# Patient Record
(demographics unavailable — no encounter records)

---

## 2025-04-25 NOTE — CONSULT LETTER
[Dear  ___] : Dear  [unfilled], [Courtesy Letter:] : I had the pleasure of seeing your patient, [unfilled], in my office today. [Please see my note below.] : Please see my note below. [Consult Closing:] : Thank you very much for allowing me to participate in the care of this patient.  If you have any questions, please do not hesitate to contact me. [Sincerely,] : Sincerely, [FreeTextEntry2] : Dr. Live Acevedo, [FreeTextEntry3] : Robel Bonilla MD, DARY, FACS  Department Otolaryngology Director of [facility] Professor of Otolaryngology,  Jessie Damian/[facility] [employer] [facility]

## 2025-04-25 NOTE — HISTORY OF PRESENT ILLNESS
[de-identified] : 24 year old male follow up s/p Bilateral endoscopic CT-guided maxillary antrostomy, sphenoethmoidectomy, frontal sinus exploration, reduction of bilateral middle turbinate fransisca. Adenoidectomy 7/20/23. Reports increase in nasal congestion for the past 1-2 months. Completed PO Augmentin and Medrol pack since last visit on 4/4/25. Reports feeling good since last visit. Using Mometasone rinses daily. Denies epistaxis, anosmia.

## 2025-04-25 NOTE — PHYSICAL EXAM
[FreeTextEntry1] : breathing well [Midline] : trachea located in midline position [Normal] : no rashes

## 2025-04-25 NOTE — PROCEDURE
[Topical Lidocaine] : topical lidocaine [Oxymetazoline HCl] : oxymetazoline HCl [Image(s) Captured] : image(s) captured and filed [Video Captured] : video captured and filed [Flexible Endoscope] : examined with the flexible endoscope [Serial Number: ___] : Serial Number: [unfilled] [Recalcitrant Symptoms] : recalcitrant symptoms  [Anterior rhinoscopy insufficient to account for symptoms] : anterior rhinoscopy insufficient to account for symptoms [Normal] : the nasopharynx was normal [FreeTextEntry6] : Pre-op indication(s): nasal obstruction Post-op indication(s): nasal obstruction Verbal consent obtained from patient. Anterior rhinoscopy insufficient to account for symptoms  Details for procedure:  Scope #: 217 Type of scope:    flexible fiber optic telescope      Anesthesia and/or vasoconstriction was achieved topically by usin% Lidocaine spray   0.05% Oxymetazoline     Other ______  The following anatomic sites were directly examined in a sequential fashion:  The scope was introduced in the nasal passage between the middle and inferior turbinates to exam the inferior portion of the middle meatus and the fontanelle, as well as the maxillary ostia. Next, the scope was passed medially and posteriorly to the middle turbinates to examine the sphenoethmoid recess and the superior turbinate region.  Upon visualization the finders are as follows:  Nasal Septum:   Normal   Bleeding site cauterized:    Anterior   left   right   Posterior   left   right  Method:   Silver Nitrate   YAG Laser    Electrocautery ______  Right Side:  * Mucosa: Normal * Mucous: Normal * Polyp: Normal * Inferior Turbinate: Normal * Middle Turbinate: Normal * Superior Turbinate: Normal * Inferior Meatus: Normal * Middle Meatus: Normal * Super Meatus: Normal * Sphenoethmoidal Recess: Normal Left Side:  * Mucosa: Normal * Mucous: Normal * Polyp: Normal * Inferior Turbinate: Normal * Middle Turbinate: Normal * Superior Turbinate: Normal * Inferior Meatus: Normal * Middle Meatus: Normal * Super Meatus: Normal * Sphenoethmoidal Recess: Normal The patient tolerated the procedure well without any complications.

## 2025-04-25 NOTE — ADDENDUM
[FreeTextEntry1] :  scribe attestation; I, Blu Gutierrez, am scribing for and in the presence of Dr. Robel Bonilla in the following sections HISTORY OF PRESENT ILLNESS, PAST MEDICAL/FAMILY/SOCIAL HISTORY; REVIEW OF SYSTEMS; VITAL SIGNS; PHYSICAL EXAM; PROCEDURES; DISCUSSION.   I, Dr. Robel Bonilla, personally performed the services described in the documentation, reviewed the documentation recorded by the scribe in my presence, and it accurately and completely records my words and actions.